# Patient Record
Sex: FEMALE | Race: WHITE | URBAN - METROPOLITAN AREA
[De-identification: names, ages, dates, MRNs, and addresses within clinical notes are randomized per-mention and may not be internally consistent; named-entity substitution may affect disease eponyms.]

---

## 2020-07-06 ENCOUNTER — OFFICE VISIT (OUTPATIENT)
Dept: PRIMARY CARE CLINIC | Age: 45
End: 2020-07-06

## 2020-07-06 PROCEDURE — 99211 OFF/OP EST MAY X REQ PHY/QHP: CPT | Performed by: NURSE PRACTITIONER

## 2020-07-10 LAB
SARS-COV-2: NOT DETECTED
SOURCE: NORMAL

## 2020-10-15 ENCOUNTER — OFFICE VISIT (OUTPATIENT)
Dept: ORTHOPEDIC SURGERY | Age: 45
End: 2020-10-15
Payer: COMMERCIAL

## 2020-10-15 VITALS — WEIGHT: 179.9 LBS | TEMPERATURE: 97.4 F | BODY MASS INDEX: 28.91 KG/M2 | HEIGHT: 66 IN

## 2020-10-15 PROCEDURE — 99204 OFFICE O/P NEW MOD 45 MIN: CPT | Performed by: ORTHOPAEDIC SURGERY

## 2020-10-15 RX ORDER — TOPIRAMATE 25 MG/1
TABLET ORAL
COMMUNITY
Start: 2020-09-22

## 2020-10-15 NOTE — PROGRESS NOTES
12 [facility]  History and Physical  Knee Pain    Date:  10/15/2020    Name:  Raul Dooley  Address:  [address]  [facility] [place] [address]    :  1975      Age:   54 y.o.    SSN:  [id number]      Medical Record Number:  <[id number]>      Chief Complaint    New Patient (left knee )      History of Present Illness:  Raul Dooley is a 54 y.o. female who presents for new patient evaluation regarding her left knee. The patient reports that at the beginning of  she had a twisting fall when she was going up the stairs. She noted swelling and pain to the knee. She did not get it looked at. She has been taking ibuprofen and using ice. The swelling decreased but she still has some ankle stability with stairs and side-to-side walking. She is also noting aching in the back of her knee and on the sides. Just recently she notes that the buckling has improved but she continues to have pain. Of note the patient has an extensive history to the left knee. 30 years ago the patient underwent a PCL reconstruction with a BTB autograft with titanium screw fixation in [place] by Dr. Sprague. Her ACL at that time was noted to be partially torn and not reconstructed. She had done pretty well with that knee. She has seen Dr. Wannetta Najjar before, roughly 13 years ago to follow-up on the knee. She had received an MRI on that knee in the past without any issue. She denies any numbness or tingling in the leg. Pain Assessment  Location of Pain: Knee  Location Modifiers: Left  Severity of Pain: 6  Quality of Pain: Sharp, Dull  Duration of Pain: Persistent  Frequency of Pain: Constant  Aggravating Factors: Stairs, Walking, Standing, Other (Comment)(sitting)  Relieving Factors: Nsaids  Result of Injury: Yes  Work-Related Injury: No  Are there other pain locations you wish to document?: No    No past medical history on file.      Past Surgical History:   Procedure Laterality Date  THYROIDECTOMY      partial    TONSILLECTOMY         No family history on file. Social History     Socioeconomic History    Marital status:      Spouse name: None    Number of children: None    Years of education: None    Highest education level: None   Occupational History    None   Social Needs    Financial resource strain: None    Food insecurity     Worry: None     Inability: None    Transportation needs     Medical: None     Non-medical: None   Tobacco Use    Smoking status: Never Smoker    Smokeless tobacco: Never Used   Substance and Sexual Activity    Alcohol use: Yes     Comment: occasionally    Drug use: No    Sexual activity: None   Lifestyle    Physical activity     Days per week: None     Minutes per session: None    Stress: None   Relationships    Social connections     Talks on phone: None     Gets together: None     Attends Druze service: None     Active member of club or organization: None     Attends meetings of clubs or organizations: None     Relationship status: None    Intimate partner violence     Fear of current or ex partner: None     Emotionally abused: None     Physically abused: None     Forced sexual activity: None   Other Topics Concern    None   Social History Narrative    None       Current Outpatient Medications   Medication Sig Dispense Refill    topiramate (TOPAMAX) 25 MG tablet Take one tablet x 3 days at dinner time then 2 tablets every day      levothyroxine (SYNTHROID) 112 MCG tablet Take 112 mcg by mouth Daily.  vitamin D (ERGOCALCIFEROL) 96510 UNITS CAPS capsule Take 50,000 Units by mouth once a week. No current facility-administered medications for this visit.         Allergies   Allergen Reactions    Ceclor [Cefaclor]     Hydrocodone     Pcn [Penicillins]     Sulfa Antibiotics          Review of Systems:  A 14 point review of systems was completed by the patient and is available in the media section of the [facility] medical translation. Negative posterior drawer. Negative Homans sign. Positive deep flexion grind. Gait:  Steady, Non antalgic, without the use of assistive devices    Alignment: No varus deformity    Neuro: Sensation equal bilateral lower extremities    Vascular: 2+ posterior tibialis pulse      Contralateral Knee Comparison Examination: Right    Inspection:  No effusion, ecchymosis, discoloration, erythema, excessive warmth. no gross deformities, no signs of infection. Palpation: There is no patellofemoral crepitus, there is no joint line tenderness. No other osseous or soft tissue tenderness around the knee. Negative calf tenderness    Range of Motion:   0-145 degrees without pain or difficulty    Strength:  5/5 quadriceps, 5/5 hamstrings    Special Tests:   No ligament instability, valgus and varus stress test are stable at 0 and 30°. Lachman's exhibits a solid endpoint. Negative posterior drawer. Negative Homans sign    Gait:  Steady, Non antalgic, without the use of assistive devices    Alignment: no Varus deformity    Neuro: Sensation equal bilateral lower extremities    Vascular: 2+ posterior tibialis pulse      Radiology:     X-rays obtained and reviewed in office: AP and merchant view of both knees and a lateral of the left knee. Impression: No fractures, dislocations, visible tumors, or signs of acute trauma. There is mild osteoarthritic changes noted to the medial aspect of the knee as well as the posterior aspect of the knee. Overall alignment is neutral.  Evidence of prior PCL reconstruction with retained titanium screws in the femur and tibia.        Office Procedures:  Orders Placed This Encounter   Procedures    XR KNEE LEFT (3 VIEWS)     Standing Status:   Future     Number of Occurrences:   1     Standing Expiration Date:   10/15/2021            Assessment: Patient is a 54 y.o. female with left knee injury, concern for medial meniscus tear    Visit Diagnoses       Codes    Left knee pain, unspecified chronicity    -  Primary M25.562          No orders of the defined types were placed in this encounter. Medical decision making:  Patient's workup and evaluation were reviewed with the patient in the office today. The patient's history of a twisting injury with swelling as well as continued pain along the medial aspect of the knee are concerning for medial meniscus tear. Given the extensive surgical history of the patient's knee had sustained in the past, we will obtain an MRI to better evaluate the knee for meniscal pathology and other potential ligamentous reinjury. We will see her after the MRI to go over the results and discuss if it requires surgical nonoperative management. All the patient's questions were answered while in the clinic. The patient is understanding of all instructions and agrees with the plan. Treatment Plan:    1. See above  2. Activity modification  3. Ice 20 minutes ever 1-2 hours PRN  4. NSAIDs as needed  5. Rest   6. Elevation at least 2 inches above the heart with pillows supporting the joint underneath  7. Lightweight exercise/low impact exercise  8. Appropriate diet/weight management      Follow up: 1 week to go over MRI results    Approximately 20 minutes was spent on patient education and coordinating care. Over 90% at this time was spent face-to-face with the patient. Marek Douglass DO  [facility] Clinical Fellow  10/15/2020      This dictation was performed with a verbal recognition program [facility] [employer]) and it was checked for errors. It is possible that there are still dictated errors within this office note. If so, please bring any errors to my attention for an addendum. All efforts were made to ensure that this office note is accurate. This dictation was performed with a verbal recognition program (DRAGON) and it was checked for errors. It is possible that there are still dictated errors within this office note.   If so, please bring

## 2020-10-22 ENCOUNTER — OFFICE VISIT (OUTPATIENT)
Dept: ORTHOPEDIC SURGERY | Age: 45
End: 2020-10-22
Payer: COMMERCIAL

## 2020-10-22 VITALS — WEIGHT: 179.9 LBS | BODY MASS INDEX: 28.91 KG/M2 | HEIGHT: 66 IN | TEMPERATURE: 97.3 F

## 2020-10-22 PROCEDURE — 20610 DRAIN/INJ JOINT/BURSA W/O US: CPT | Performed by: ORTHOPAEDIC SURGERY

## 2020-10-22 PROCEDURE — 99213 OFFICE O/P EST LOW 20 MIN: CPT | Performed by: ORTHOPAEDIC SURGERY

## 2020-10-22 RX ORDER — METHYLPREDNISOLONE ACETATE 40 MG/ML
40 INJECTION, SUSPENSION INTRA-ARTICULAR; INTRALESIONAL; INTRAMUSCULAR; SOFT TISSUE ONCE
Status: COMPLETED | OUTPATIENT
Start: 2020-10-22 | End: 2020-10-22

## 2020-10-22 RX ADMIN — METHYLPREDNISOLONE ACETATE 40 MG: 40 INJECTION, SUSPENSION INTRA-ARTICULAR; INTRALESIONAL; INTRAMUSCULAR; SOFT TISSUE at 15:17

## 2020-10-22 NOTE — PROGRESS NOTES
12 [facility]  History and Physical  Knee Pain    Date:  10/22/2020    Name:  Scheryl Duane  Address:  [address]  [facility] [place] [address]    :  1975      Age:   54 y.o.    SSN:  [id number]      Medical Record Number:  <[id number]>      Chief Complaint    Follow-up (left knee, MRI results )      History of Present Illness:  Scheryl Duane is a 54 y.o. female who presents for a follow-up evaluation of her left knee pain and review of her MRI results. Patient has a relevant past medical history of a PCL reconstruction utilizing bone patellar tendon bone autograft with a Titan screw fixation in [place] by Dr. Preet Huerta over 30 years ago. At that time she did have a noted partial ACL tear that was treated nonoperatively. At the beginning of 2020 she sustained a twisting fall injury upon going up steps. She notes pain and swelling in her knee. She utilize conservative therapy and noticed some decrease in her swelling. She still noted pain in the popliteal space as well as several episodes of buckling. For this reason we obtained an MRI to evaluate for possible meniscus tear. Today, the patient states that her symptoms have not changed. She is here to review her MRI results and further coordinate care. The patient denies any new injury. The patient denies any joint locking, numbness, paresthesias, or weakness. Pain Assessment  Location of Pain: Knee  Location Modifiers: Left  Severity of Pain: 5  Quality of Pain: Dull, Aching  Duration of Pain: Persistent  Frequency of Pain: Constant  Aggravating Factors: Walking, Stairs, Other (Comment)(sitting)  Relieving Factors: Ice, Nsaids  Result of Injury: Yes  Work-Related Injury: No  Are there other pain locations you wish to document?: No    No past medical history on file.      Past Surgical History:   Procedure Laterality Date    THYROIDECTOMY      partial    TONSILLECTOMY         No family history on file.    Social History     Socioeconomic History    Marital status:      Spouse name: None    Number of children: None    Years of education: None    Highest education level: None   Occupational History    None   Social Needs    Financial resource strain: None    Food insecurity     Worry: None     Inability: None    Transportation needs     Medical: None     Non-medical: None   Tobacco Use    Smoking status: Never Smoker    Smokeless tobacco: Never Used   Substance and Sexual Activity    Alcohol use: Yes     Comment: occasionally    Drug use: No    Sexual activity: None   Lifestyle    Physical activity     Days per week: None     Minutes per session: None    Stress: None   Relationships    Social connections     Talks on phone: None     Gets together: None     Attends Jain service: None     Active member of club or organization: None     Attends meetings of clubs or organizations: None     Relationship status: None    Intimate partner violence     Fear of current or ex partner: None     Emotionally abused: None     Physically abused: None     Forced sexual activity: None   Other Topics Concern    None   Social History Narrative    None       Current Outpatient Medications   Medication Sig Dispense Refill    topiramate (TOPAMAX) 25 MG tablet Take one tablet x 3 days at dinner time then 2 tablets every day      levothyroxine (SYNTHROID) 112 MCG tablet Take 112 mcg by mouth Daily.  vitamin D (ERGOCALCIFEROL) 04004 UNITS CAPS capsule Take 50,000 Units by mouth once a week. No current facility-administered medications for this visit. Allergies   Allergen Reactions    Ceclor [Cefaclor]     Hydrocodone     Pcn [Penicillins]     Sulfa Antibiotics          Review of Systems:  A 14 point review of systems was completed by the patient and is available in the media section of the scanned medical record and was reviewed on 10/22/2020.   The review is negative with the intended recipient of this document, please advise us by calling immediately [phone].         [facility], [facility] [place]              Patient Name: Arun Ramírez    Case ID: [id number]    Patient : 1975    Referring Physician: Mike Dillon MD    Exam Date: 10/21/2020    Exam Description: MR Left Knee w/o Contrast              HISTORY:  Pain.         TECHNICAL FACTORS:  Long- and short-axis fat- and water-weighted images were performed.         COMPARISON:  MRI left knee report on 2006.         FINDINGS:  No acute fracture or contusion.  Noncommunicating intrameniscal signal in the body    and posterior horn medial meniscus.  A chronic stable-appearing vertical tear in the posterior    root and horn red-red zone lateral meniscus.         ACL is chronically attenuated and inflamed, impinged by spur of lateral femoral condyle.      Status post PCL reconstruction.  Mild chronic inflammation of PCL.  No posterior tibial    translation.         No acute sprain of medial or lateral collateral ligament complexes.  Inflammation of popliteal    tendon and fibular collateral ligament.  Soft tissue inflammation between the iliotibial band    and lateral femoral condyle.  Moderate spurring of femorotibial compartments.         Chondral thinning and fraying of weightbearing medial femoral condyle.  Chondral thinning and    fissuring of posterior nonweightbearing lateral femoral condyle.         Mild lateral subluxation of the patella.  Diminished patellofemoral joint space with    full-thickness chondral loss and osseous erosion.         Quadriceps and patellar tendons intact.         No acute muscle strain.         CONCLUSION:    1. Grade 4 chondromalacia of patellofemoral articulation with full-thickness chondral loss and    osseous erosion.     2. Status post PCL reconstruction.  Chronically inflamed graft with no acute sprain.  Notch    stenosis from spurring.  Attenuated ACL with chronic inflammation. 3. Iliotibial band syndrome. 4. Grade 2-3 chondromalacia of weightbearing medial femoral condyle.  Grade 3 chondromalacia of     posterior nonweightbearing lateral femoral condyle.         Thank you for the opportunity to provide your interpretation.         Da Chilel M.D.         A: MELANI/ct 10/21/2020 10:43 PM    T: CT 10/21/2020 7:58 PM          Office Procedures:  Orders Placed This Encounter   Procedures    NM ARTHROCENTESIS ASPIR&/INJ MAJOR JT/BURSA W/O US     After informed consent was provided, the patient was seated on the exam table with the left knee flexed to 90 degrees. The anterolateral aspect of the knee adjacent to the joint line was prepped with Chlora-prep. The skin and subcutaneous tissues were anesthetized with ethyl chloride spray. A 22-gauge needle was inserted into the left knee and 2 ml of 40 mg/ml DepoMedrol was injected. The needle was withdrawn and the puncture site sealed with a Band-Aid. The patient tolerated the procedure well. Post injection instructions and precautions given and any problems to notify us. Assessment: Patient is a 54 y.o. female presenting to the office for follow-up evaluation of her left knee pain as well as review of her MRI results. Patient has a diagnosis of tricompartmental osteoarthritis, IT band syndrome, and chronic inflammation of her PCL and ACL. Visit Diagnoses       Codes    Primary osteoarthritis of left knee    -  Primary M17.12    Left knee pain, unspecified chronicity     M25.562          Medical decision making:  Patient's workup and evaluation were reviewed with the patient in the office today. Patient's MRI results were thoroughly reviewed with her in the office today. Given the patient's history and physical exam I believe the patient will do well with conservative therapy. She likely exacerbated her arthritis from the fall.   Patient was educated on conservative therapy for her condition as detailed in the treatment plan below. She was given a corticosteroid injection today in order to decrease inflammation within the knee. She should follow-up as needed. All the patient's questions were answered while in the clinic. The patient is understanding of all instructions and agrees with the plan. Patient does not smoke and did not require smoking cessation patient education. Treatment Plan:    1. Observe postinjection precautions  2. Activity modification  3. Ice 20 minutes ever 1-2 hours PRN  4. NSAIDs as needed  5. Rest   6. Elevation at least 2 inches above the heart with pillows supporting the joint underneath  7. Lightweight exercise/low impact exercise  8. Appropriate diet/weight management      Follow up: As needed            Damian Zee PA-C    Physician Assistant - Certified    44/18/17 4:19 PM    During this examination, I, Leeanne Matthews, functioned as a scribe for Dr. Felipe Amador. This dictation was performed with a verbal recognition program (DRAGON) and it was checked for errors. It is possible that there are still dictated errors within this office note. If so, please bring any errors to my attention for an addendum. All efforts were made to ensure that this office note is accurate. This dictation was performed with a verbal recognition program (DRAGON) and it was checked for errors. It is possible that there are still dictated errors within this office note. If so, please bring any errors to my attention for an addendum. All efforts were made to ensure that this office note is accurate. Supervising Physician Attestation:  I, Dr. Felipe Amador, personally performed the services described in this documentation as scribed above, and it is both accurate and complete and I agree with all pertinent clinical information. I personally interviewed the patient and performed a physical examination and medical decision making.  I discussed the patient's condition and treatment options and have  reviewed and agree with the past medical, family and social history unless otherwise noted. All of the patient's questions were answered.       Board Certified Orthopaedic Surgeon  [address] [facility] and [address] and [address] and [employer]  Professor of [address]

## 2021-11-15 ENCOUNTER — CLINICAL DOCUMENTATION (OUTPATIENT)
Dept: OTHER | Age: 46
End: 2021-11-15

## 2024-03-25 ENCOUNTER — OFFICE VISIT (OUTPATIENT)
Dept: ORTHOPEDIC SURGERY | Age: 49
End: 2024-03-25
Payer: COMMERCIAL

## 2024-03-25 VITALS — WEIGHT: 179 LBS | BODY MASS INDEX: 28.77 KG/M2 | HEIGHT: 66 IN

## 2024-03-25 DIAGNOSIS — M25.552 LEFT HIP PAIN: Primary | ICD-10-CM

## 2024-03-25 PROCEDURE — 99204 OFFICE O/P NEW MOD 45 MIN: CPT | Performed by: ORTHOPAEDIC SURGERY

## 2024-03-26 ENCOUNTER — TELEPHONE (OUTPATIENT)
Dept: ORTHOPEDIC SURGERY | Age: 49
End: 2024-03-26

## 2024-03-26 RX ORDER — METHYLPREDNISOLONE 4 MG/1
TABLET ORAL
Qty: 1 KIT | Refills: 0 | Status: SHIPPED | OUTPATIENT
Start: 2024-03-26

## 2024-03-26 NOTE — PROGRESS NOTES
3/25/2024     Reason for visit:  Left hip pain    History of Present Illness:  The patient is a 48-year-old female who presents for evaluation.  She reports left hip pain for several weeks.  No traumatic injury.  She does report that majority of pain is actually in her lower back region on the left side radiates into the lower posterior buttock region.  Occasional radiation anterior.  Occasional numbness and tingling within the posterior region.  Otherwise no groin pain.    Medical History:  No past medical history on file.   Past Surgical History:   Procedure Laterality Date    THYROIDECTOMY      partial    TONSILLECTOMY        No family history on file.   Social History     Socioeconomic History    Marital status:      Spouse name: Not on file    Number of children: Not on file    Years of education: Not on file    Highest education level: Not on file   Occupational History    Not on file   Tobacco Use    Smoking status: Never    Smokeless tobacco: Never   Substance and Sexual Activity    Alcohol use: Yes     Comment: occasionally    Drug use: No    Sexual activity: Not on file   Other Topics Concern    Not on file   Social History Narrative    Not on file     Social Determinants of Health     Financial Resource Strain: Not on file   Food Insecurity: Not on file   Transportation Needs: Not on file   Physical Activity: Not on file   Stress: Not on file   Social Connections: Not on file   Intimate Partner Violence: Not on file   Housing Stability: Not on file      Current Outpatient Medications on File Prior to Visit   Medication Sig Dispense Refill    topiramate (TOPAMAX) 25 MG tablet Take one tablet x 3 days at dinner time then 2 tablets every day      levothyroxine (SYNTHROID) 112 MCG tablet Take 112 mcg by mouth Daily.      vitamin D (ERGOCALCIFEROL) 16108 UNITS CAPS capsule Take 50,000 Units by mouth once a week.       No current facility-administered medications on file prior to visit.

## 2024-03-26 NOTE — TELEPHONE ENCOUNTER
Prescription Refill     Medication Name:  STEROID AND ANTI-INFLAMMATORY MEDS  Pharmacy: [facility] [address] [place] [address] [phone]  Patient Contact Number:  [phone]

## 2024-06-02 ENCOUNTER — APPOINTMENT (OUTPATIENT)
Dept: CT IMAGING | Age: 49
End: 2024-06-02
Payer: COMMERCIAL

## 2024-06-02 ENCOUNTER — HOSPITAL ENCOUNTER (EMERGENCY)
Age: 49
Discharge: HOME | End: 2024-06-02
Attending: STUDENT IN AN ORGANIZED HEALTH CARE EDUCATION/TRAINING PROGRAM
Payer: COMMERCIAL

## 2024-06-02 VITALS
OXYGEN SATURATION: 100 % | HEIGHT: 66 IN | WEIGHT: 192.6 LBS | HEART RATE: 96 BPM | TEMPERATURE: 98.4 F | SYSTOLIC BLOOD PRESSURE: 143 MMHG | BODY MASS INDEX: 30.95 KG/M2 | RESPIRATION RATE: 12 BRPM | DIASTOLIC BLOOD PRESSURE: 98 MMHG

## 2024-06-02 DIAGNOSIS — S39.012A STRAIN OF LUMBAR REGION, INITIAL ENCOUNTER: Primary | ICD-10-CM

## 2024-06-02 LAB — HCG SERPL QL: NEGATIVE

## 2024-06-02 PROCEDURE — 72131 CT LUMBAR SPINE W/O DYE: CPT

## 2024-06-02 PROCEDURE — 6370000000 HC RX 637 (ALT 250 FOR IP): Performed by: STUDENT IN AN ORGANIZED HEALTH CARE EDUCATION/TRAINING PROGRAM

## 2024-06-02 PROCEDURE — 96375 TX/PRO/DX INJ NEW DRUG ADDON: CPT

## 2024-06-02 PROCEDURE — 96374 THER/PROPH/DIAG INJ IV PUSH: CPT

## 2024-06-02 PROCEDURE — 99284 EMERGENCY DEPT VISIT MOD MDM: CPT

## 2024-06-02 PROCEDURE — 6360000002 HC RX W HCPCS: Performed by: STUDENT IN AN ORGANIZED HEALTH CARE EDUCATION/TRAINING PROGRAM

## 2024-06-02 PROCEDURE — 84703 CHORIONIC GONADOTROPIN ASSAY: CPT

## 2024-06-02 RX ORDER — LIDOCAINE 50 MG/G
1 PATCH TOPICAL DAILY
Qty: 10 PATCH | Refills: 0 | Status: SHIPPED | OUTPATIENT
Start: 2024-06-02 | End: 2024-06-12

## 2024-06-02 RX ORDER — METHOCARBAMOL 750 MG/1
750 TABLET, FILM COATED ORAL 4 TIMES DAILY PRN
Qty: 20 TABLET | Refills: 0 | Status: SHIPPED | OUTPATIENT
Start: 2024-06-02 | End: 2024-06-07

## 2024-06-02 RX ORDER — DIAZEPAM 5 MG/1
5 TABLET ORAL ONCE
Status: COMPLETED | OUTPATIENT
Start: 2024-06-02 | End: 2024-06-02

## 2024-06-02 RX ORDER — PREDNISONE 50 MG/1
50 TABLET ORAL DAILY
Qty: 5 TABLET | Refills: 0 | Status: SHIPPED | OUTPATIENT
Start: 2024-06-02 | End: 2024-06-07

## 2024-06-02 RX ORDER — ONDANSETRON 2 MG/ML
4 INJECTION INTRAMUSCULAR; INTRAVENOUS ONCE
Status: COMPLETED | OUTPATIENT
Start: 2024-06-02 | End: 2024-06-02

## 2024-06-02 RX ORDER — LIDOCAINE 4 G/G
1 PATCH TOPICAL ONCE
Status: DISCONTINUED | OUTPATIENT
Start: 2024-06-02 | End: 2024-06-02 | Stop reason: HOSPADM

## 2024-06-02 RX ORDER — FENTANYL CITRATE 50 UG/ML
50 INJECTION, SOLUTION INTRAMUSCULAR; INTRAVENOUS ONCE
Status: COMPLETED | OUTPATIENT
Start: 2024-06-02 | End: 2024-06-02

## 2024-06-02 RX ADMIN — DIAZEPAM 5 MG: 5 TABLET ORAL at 14:44

## 2024-06-02 RX ADMIN — FENTANYL CITRATE 50 MCG: 50 INJECTION INTRAMUSCULAR; INTRAVENOUS at 14:47

## 2024-06-02 RX ADMIN — ONDANSETRON 4 MG: 2 INJECTION INTRAMUSCULAR; INTRAVENOUS at 14:45

## 2024-06-02 ASSESSMENT — PAIN DESCRIPTION - ONSET: ONSET: ON-GOING

## 2024-06-02 ASSESSMENT — PAIN DESCRIPTION - ORIENTATION: ORIENTATION: LOWER

## 2024-06-02 ASSESSMENT — PAIN DESCRIPTION - FREQUENCY: FREQUENCY: CONTINUOUS

## 2024-06-02 ASSESSMENT — PAIN - FUNCTIONAL ASSESSMENT
PAIN_FUNCTIONAL_ASSESSMENT: 0-10
PAIN_FUNCTIONAL_ASSESSMENT: PREVENTS OR INTERFERES WITH MANY ACTIVE NOT PASSIVE ACTIVITIES

## 2024-06-02 ASSESSMENT — PAIN DESCRIPTION - PAIN TYPE: TYPE: ACUTE PAIN

## 2024-06-02 ASSESSMENT — PAIN DESCRIPTION - LOCATION: LOCATION: BACK

## 2024-06-02 ASSESSMENT — PAIN SCALES - GENERAL: PAINLEVEL_OUTOF10: 3

## 2024-06-02 NOTE — DISCHARGE INSTRUCTIONS
Please take over-the-counter Tylenol/ibuprofen as needed for pain control.  Please refer to the dosing instructions on the packaging.

## 2024-06-02 NOTE — ED PROVIDER NOTES
considered, but did not perform, additional testing such as MRI Spine or Brain, as well as admission or transfer to a higher level of care.     I utilized an evidence-based risk rating tool (CMT) along with my training and experience to weigh the risk of discharge against the risks of further testing, imaging, or hospitalization. At this time, I estimate the risks of additional testing, imaging, or hospitalization (in the case of discharge) to be equal to or greater than the risk of discharge. Given the symptoms and findings present at this time, the chance of SEA or SCC is so remote that additional testing or imaging is more likely to harm the patient than diagnose SEA. [id number]    SHARED DECISION MAKING:   I discussed my risk assessment with the patient. The patient understands and consents to the risk of disposition/plan, as well as the risk of uncertainty in estimating outcomes. [id number]               Patient was given the following medications:   Medications   lidocaine 4 % external patch 1 patch (1 patch TransDERmal Patch Applied 6/2/24 1443)   diazePAM (VALIUM) tablet 5 mg (5 mg Oral Given 6/2/24 1444)   fentaNYL (SUBLIMAZE) injection 50 mcg (50 mcg IntraVENous Given 6/2/24 1447)   ondansetron (ZOFRAN) injection 4 mg (4 mg IntraVENous Given 6/2/24 1445)        CONSULTS:   None   Discussion with Other Professionals: None   {Social Determinants: None   Chronic Conditions:  None    Records Reviewed: NA      Disposition Considerations: Appropriate for outpatient management  I am the Primary Clinician of Record.        FINAL IMPRESSION    1. Strain of lumbar region, initial encounter           DISPOSITION/PLAN:     Pt discharged home in stable condition.     PATIENT REFERRED TO:   Lindy Chisholm MD  [facility] [address]  [facility] [address] [phone]    Schedule an appointment as soon as possible for a visit in 3 days      [facility] Emergency Department  [address]